# Patient Record
Sex: FEMALE | Race: WHITE | NOT HISPANIC OR LATINO | Employment: FULL TIME | URBAN - METROPOLITAN AREA
[De-identification: names, ages, dates, MRNs, and addresses within clinical notes are randomized per-mention and may not be internally consistent; named-entity substitution may affect disease eponyms.]

---

## 2017-02-16 ENCOUNTER — APPOINTMENT (OUTPATIENT)
Dept: WOMENS IMAGING | Facility: HOSPITAL | Age: 27
End: 2017-02-16

## 2017-02-16 PROCEDURE — 76641 ULTRASOUND BREAST COMPLETE: CPT | Performed by: RADIOLOGY

## 2017-02-16 PROCEDURE — G0206 DX MAMMO INCL CAD UNI: HCPCS | Performed by: RADIOLOGY

## 2017-02-16 PROCEDURE — 77065 DX MAMMO INCL CAD UNI: CPT | Performed by: RADIOLOGY

## 2017-02-17 ENCOUNTER — APPOINTMENT (OUTPATIENT)
Dept: WOMENS IMAGING | Facility: HOSPITAL | Age: 27
End: 2017-02-17

## 2017-02-17 PROCEDURE — 19083 BX BREAST 1ST LESION US IMAG: CPT | Performed by: RADIOLOGY

## 2017-02-17 PROCEDURE — 19000 PUNCTURE ASPIR CYST BREAST: CPT | Performed by: RADIOLOGY

## 2017-02-17 PROCEDURE — 76942 ECHO GUIDE FOR BIOPSY: CPT | Performed by: RADIOLOGY

## 2017-05-30 DIAGNOSIS — Z36.89 ENCOUNTER TO ESTABLISH GESTATIONAL AGE USING ULTRASOUND: Primary | ICD-10-CM

## 2017-06-14 ENCOUNTER — INITIAL PRENATAL (OUTPATIENT)
Dept: OBSTETRICS AND GYNECOLOGY | Facility: CLINIC | Age: 27
End: 2017-06-14

## 2017-06-14 ENCOUNTER — APPOINTMENT (OUTPATIENT)
Dept: LAB | Facility: HOSPITAL | Age: 27
End: 2017-06-14

## 2017-06-14 VITALS — SYSTOLIC BLOOD PRESSURE: 110 MMHG | WEIGHT: 108 LBS | DIASTOLIC BLOOD PRESSURE: 80 MMHG | BODY MASS INDEX: 19.75 KG/M2

## 2017-06-14 DIAGNOSIS — Z34.01 ENCOUNTER FOR SUPERVISION OF NORMAL FIRST PREGNANCY IN FIRST TRIMESTER: ICD-10-CM

## 2017-06-14 DIAGNOSIS — Z36.9 PRENATAL SCREENING ENCOUNTER: Primary | ICD-10-CM

## 2017-06-14 LAB
ABO GROUP BLD: NORMAL
BACTERIA UR QL AUTO: ABNORMAL /HPF
BASOPHILS # BLD AUTO: 0.03 10*3/MM3 (ref 0–0.2)
BASOPHILS NFR BLD AUTO: 0.3 % (ref 0–1)
BILIRUB UR QL STRIP: NEGATIVE
BLD GP AB SCN SERPL QL: NEGATIVE
CLARITY UR: CLEAR
COLOR UR: YELLOW
DEPRECATED RDW RBC AUTO: 42.8 FL (ref 37–54)
EOSINOPHIL # BLD AUTO: 0.05 10*3/MM3 (ref 0.1–0.3)
EOSINOPHIL NFR BLD AUTO: 0.6 % (ref 0–3)
ERYTHROCYTE [DISTWIDTH] IN BLOOD BY AUTOMATED COUNT: 12.4 % (ref 11.3–14.5)
GLUCOSE UR STRIP-MCNC: NEGATIVE MG/DL
HBA1C MFR BLD: 5.3 % (ref 4.8–5.6)
HBV SURFACE AG SERPL QL IA: NORMAL
HCT VFR BLD AUTO: 42.9 % (ref 34.5–44)
HCV AB SER DONR QL: NORMAL
HGB BLD-MCNC: 14.1 G/DL (ref 11.5–15.5)
HGB UR QL STRIP.AUTO: NEGATIVE
HIV1+2 AB SER QL: NORMAL
HYALINE CASTS UR QL AUTO: ABNORMAL /LPF
IMM GRANULOCYTES # BLD: 0.01 10*3/MM3 (ref 0–0.03)
IMM GRANULOCYTES NFR BLD: 0.1 % (ref 0–0.6)
KETONES UR QL STRIP: NEGATIVE
LEUKOCYTE ESTERASE UR QL STRIP.AUTO: ABNORMAL
LYMPHOCYTES # BLD AUTO: 1.96 10*3/MM3 (ref 0.6–4.8)
LYMPHOCYTES NFR BLD AUTO: 21.6 % (ref 24–44)
MCH RBC QN AUTO: 31.1 PG (ref 27–31)
MCHC RBC AUTO-ENTMCNC: 32.9 G/DL (ref 32–36)
MCV RBC AUTO: 94.5 FL (ref 80–99)
MONOCYTES # BLD AUTO: 0.71 10*3/MM3 (ref 0–1)
MONOCYTES NFR BLD AUTO: 7.8 % (ref 0–12)
NEUTROPHILS # BLD AUTO: 6.3 10*3/MM3 (ref 1.5–8.3)
NEUTROPHILS NFR BLD AUTO: 69.6 % (ref 41–71)
NITRITE UR QL STRIP: NEGATIVE
PH UR STRIP.AUTO: 7 [PH] (ref 5–8)
PLATELET # BLD AUTO: 199 10*3/MM3 (ref 150–450)
PMV BLD AUTO: 12.2 FL (ref 6–12)
PROT UR QL STRIP: NEGATIVE
RBC # BLD AUTO: 4.54 10*6/MM3 (ref 3.89–5.14)
RBC # UR: ABNORMAL /HPF
REF LAB TEST METHOD: ABNORMAL
RH BLD: POSITIVE
RUBV IGG SER QL: NORMAL
RUBV IGG SER-ACNC: 55.7 IU/ML
SP GR UR STRIP: 1.01 (ref 1–1.03)
SQUAMOUS #/AREA URNS HPF: ABNORMAL /HPF
TSH SERPL DL<=0.05 MIU/L-ACNC: 2.54 MIU/ML (ref 0.35–5.35)
UROBILINOGEN UR QL STRIP: ABNORMAL
WBC NRBC COR # BLD: 9.06 10*3/MM3 (ref 3.5–10.8)
WBC UR QL AUTO: ABNORMAL /HPF

## 2017-06-14 PROCEDURE — 84443 ASSAY THYROID STIM HORMONE: CPT | Performed by: OBSTETRICS & GYNECOLOGY

## 2017-06-14 PROCEDURE — 86900 BLOOD TYPING SEROLOGIC ABO: CPT | Performed by: OBSTETRICS & GYNECOLOGY

## 2017-06-14 PROCEDURE — 87086 URINE CULTURE/COLONY COUNT: CPT | Performed by: OBSTETRICS & GYNECOLOGY

## 2017-06-14 PROCEDURE — 85025 COMPLETE CBC W/AUTO DIFF WBC: CPT | Performed by: OBSTETRICS & GYNECOLOGY

## 2017-06-14 PROCEDURE — 0501F PRENATAL FLOW SHEET: CPT | Performed by: OBSTETRICS & GYNECOLOGY

## 2017-06-14 PROCEDURE — 86787 VARICELLA-ZOSTER ANTIBODY: CPT | Performed by: OBSTETRICS & GYNECOLOGY

## 2017-06-14 PROCEDURE — 83036 HEMOGLOBIN GLYCOSYLATED A1C: CPT | Performed by: OBSTETRICS & GYNECOLOGY

## 2017-06-14 PROCEDURE — 86803 HEPATITIS C AB TEST: CPT | Performed by: OBSTETRICS & GYNECOLOGY

## 2017-06-14 PROCEDURE — 80081 OBSTETRIC PANEL INC HIV TSTG: CPT | Performed by: OBSTETRICS & GYNECOLOGY

## 2017-06-14 PROCEDURE — 86850 RBC ANTIBODY SCREEN: CPT | Performed by: OBSTETRICS & GYNECOLOGY

## 2017-06-14 PROCEDURE — 36415 COLL VENOUS BLD VENIPUNCTURE: CPT | Performed by: OBSTETRICS & GYNECOLOGY

## 2017-06-14 PROCEDURE — 81001 URINALYSIS AUTO W/SCOPE: CPT | Performed by: OBSTETRICS & GYNECOLOGY

## 2017-06-14 PROCEDURE — 86901 BLOOD TYPING SEROLOGIC RH(D): CPT | Performed by: OBSTETRICS & GYNECOLOGY

## 2017-06-14 PROCEDURE — G0432 EIA HIV-1/HIV-2 SCREEN: HCPCS | Performed by: OBSTETRICS & GYNECOLOGY

## 2017-06-14 PROCEDURE — 87340 HEPATITIS B SURFACE AG IA: CPT | Performed by: OBSTETRICS & GYNECOLOGY

## 2017-06-14 PROCEDURE — 86762 RUBELLA ANTIBODY: CPT | Performed by: OBSTETRICS & GYNECOLOGY

## 2017-06-14 NOTE — PROGRESS NOTES
@SUBJECTIVEBEGIN  Chief Complaint   Patient presents with   • Initial Prenatal Visit     C/O mild cramping on right side.  LP 2016  neg   • Nausea       Jeri Chambers is a 27 y.o. year old .  Patient's last menstrual period was 2017..  She presents to be seen to initiate prenatal care.    She is currently having no problems   As it relates to the pregnancy, she has no concerns     Past Medical History:   Diagnosis Date   • Ovarian cyst    ,   Past Surgical History:   Procedure Laterality Date   • APPENDECTOMY N/A 10/5/2016    Procedure: APPENDECTOMY LAPAROSCOPIC;  Surgeon: Jean-Pierre Linder MD;  Location: [facility];  Service:    • APPENDECTOMY  10/05/2016   • BREAST BIOPSY     ,   Family History   Problem Relation Age of Onset   • No Known Problems Mother    • No Known Problems Father    • Colon cancer Paternal Grandfather    • Breast cancer Paternal Aunt    • Ovarian cancer Neg Hx    • Diabetes Neg Hx    ,   Social History   Substance Use Topics   • Smoking status: Never Smoker   • Smokeless tobacco: None   • Alcohol use No      Comment: OCCASIONAL   ,   (Not in a hospital admission) and Allergies:  Penicillins    Smoking status: Never Smoker                                                                 Smokeless status: Not on file                         The following portions of the patient's history were reviewed and updated as appropriate:vital signs, allergies, current medications, past medical history, past social history, past surgical history and problem list.    Objective     Imaging   No data reviewed    Review of Systems - History obtained from the patient  General ROS: negative  Psychological ROS: negative  ENT ROS: negative  Allergy and Immunology ROS: negative  Hematological and Lymphatic ROS: negative  Endocrine ROS: negative  Breast ROS: negative for breast lumps  Respiratory ROS: no cough, shortness of breath, or wheezing  Cardiovascular ROS: no chest pain or dyspnea on  exertion  Gastrointestinal ROS: no abdominal pain, change in bowel habits, or black or bloody stools  Genito-Urinary ROS: no dysuria, trouble voiding, or hematuria  Musculoskeletal ROS: negative  Neurological ROS: no TIA or stroke symptoms  Dermatological ROS: negative     Physical Exam:  See Episode    Assessment/Plan   ASSESSMENT  Jeri was seen today for initial prenatal visit and nausea.    Diagnoses and all orders for this visit:    Prenatal screening encounter  -     Obstetric Panel  -     HIV-1 / O / 2 Ag / Antibody 4th Generation  -     Varicella Zoster Antibody, IgG  -     Chlamydia trachomatis, Neisseria gonorrhoeae, Trichomonas vaginalis, PCR  -     Hemoglobin A1c  -     Hepatitis C Antibody  -     TSH  -     Urinalysis With / Culture If Indicated    Encounter for supervision of normal first pregnancy in first trimester    Other orders  -     Cancel: Urinalysis With Microscopic; Future  -     Cancel: Urine Culture; Future  -     Cancel: IGP, CtNg, Rfx Aptima HPV ASCU; Future        PLAN  1. Tests ordered today:  Orders Placed This Encounter   Procedures   • Chlamydia trachomatis, Neisseria gonorrhoeae, Trichomonas vaginalis, PCR   • Obstetric Panel   • HIV-1 / O / 2 Ag / Antibody 4th Generation   • Varicella Zoster Antibody, IgG   • Hemoglobin A1c   • Hepatitis C Antibody   • TSH   • Urinalysis With / Culture If Indicated     2. Medications prescribed today:  No orders of the defined types were placed in this encounter.    3. Information reviewed: exercise in pregnancy, nutrition in pregnancy, weight gain in pregnancy, work and travel restrictions during pregnancy, list of OTC medications acceptable in pregnancy and call coverage groups  4. Genetic testing reviewed: declines  5. The problem list for pregnancy was initiated today  6. Pt does not want treatment for nausea     Follow up: 2 week(s)       This note was electronically signed.    Kobe De Luna MD   June 14, 2017

## 2017-06-16 LAB
BACTERIA SPEC AEROBE CULT: NORMAL
RPR SER QL: NORMAL
VZV IGG SER IA-ACNC: 2167 INDEX

## 2017-06-29 ENCOUNTER — HOSPITAL ENCOUNTER (OUTPATIENT)
Dept: WOMENS IMAGING | Facility: HOSPITAL | Age: 27
Discharge: HOME | End: 2017-06-29
Attending: OBSTETRICS & GYNECOLOGY | Admitting: OBSTETRICS & GYNECOLOGY

## 2017-06-29 ENCOUNTER — ROUTINE PRENATAL (OUTPATIENT)
Dept: OBSTETRICS AND GYNECOLOGY | Facility: CLINIC | Age: 27
End: 2017-06-29

## 2017-06-29 VITALS — BODY MASS INDEX: 20.12 KG/M2 | SYSTOLIC BLOOD PRESSURE: 100 MMHG | WEIGHT: 110 LBS | DIASTOLIC BLOOD PRESSURE: 72 MMHG

## 2017-06-29 DIAGNOSIS — Z36.2 ENCOUNTER FOR REPEAT ULTRASOUND FOR FETAL HEART RATE: ICD-10-CM

## 2017-06-29 DIAGNOSIS — Z36.9 PRENATAL SCREENING ENCOUNTER: ICD-10-CM

## 2017-06-29 DIAGNOSIS — Z20.821 ZIKA VIRUS EXPOSURE: ICD-10-CM

## 2017-06-29 DIAGNOSIS — Z34.01 ENCOUNTER FOR SUPERVISION OF NORMAL FIRST PREGNANCY IN FIRST TRIMESTER: Primary | ICD-10-CM

## 2017-06-29 PROCEDURE — 76817 TRANSVAGINAL US OBSTETRIC: CPT | Performed by: OBSTETRICS & GYNECOLOGY

## 2017-06-29 PROCEDURE — 0502F SUBSEQUENT PRENATAL CARE: CPT | Performed by: OBSTETRICS & GYNECOLOGY

## 2017-06-29 PROCEDURE — 76817 TRANSVAGINAL US OBSTETRIC: CPT

## 2017-06-29 NOTE — PROGRESS NOTES
No results found for: ABORH, LABANTI, ABID    Chief Complaint   Patient presents with   • Routine Prenatal Visit     Complains of some nausea , not bad.       Today Jeri complains of nausea without vomiting. Patient has been to [place] and wants Zika testing  See ob flowsheet for physical exam.    Prenatal Assessment  Fetal Heart Rate: -  Prenatal Vitals  BP: 100/72  Weight: 110 lb (49.9 kg)     Tests done today: none  At the time of the next visit, she will need to have none    Impression: No diagnosis found.    Plan: Return 4 weeks, pt sent to [facility] for US and Zika screening    This note was electronically signed.    Kobe De Luna MD

## 2017-06-30 ENCOUNTER — APPOINTMENT (OUTPATIENT)
Dept: LAB | Facility: HOSPITAL | Age: 27
End: 2017-06-30

## 2017-06-30 PROCEDURE — 87798 DETECT AGENT NOS DNA AMP: CPT | Performed by: OBSTETRICS & GYNECOLOGY

## 2017-07-13 LAB
ZIKA VIRUS, NAA, SERUM: NEGATIVE
ZIKA VIRUS, NAA, URINE: NEGATIVE

## 2017-07-27 ENCOUNTER — APPOINTMENT (OUTPATIENT)
Dept: WOMENS IMAGING | Facility: HOSPITAL | Age: 27
End: 2017-07-27
Attending: OBSTETRICS & GYNECOLOGY

## 2017-07-27 ENCOUNTER — ROUTINE PRENATAL (OUTPATIENT)
Dept: OBSTETRICS AND GYNECOLOGY | Facility: CLINIC | Age: 27
End: 2017-07-27

## 2017-07-27 VITALS — WEIGHT: 115 LBS | SYSTOLIC BLOOD PRESSURE: 104 MMHG | DIASTOLIC BLOOD PRESSURE: 60 MMHG | BODY MASS INDEX: 21.03 KG/M2

## 2017-07-27 DIAGNOSIS — Z34.01 ENCOUNTER FOR SUPERVISION OF NORMAL FIRST PREGNANCY IN FIRST TRIMESTER: Primary | ICD-10-CM

## 2017-07-27 PROCEDURE — 99213 OFFICE O/P EST LOW 20 MIN: CPT | Performed by: OBSTETRICS & GYNECOLOGY

## 2017-07-27 NOTE — PROGRESS NOTES
No results found for: ABORH, LABANTI, ABID    Chief Complaint   Patient presents with   • Routine Prenatal Visit     Pt. has no complaints today.        Today Jeri has no specific complaints  See ob flowsheet for physical exam.    Prenatal Assessment  Fetal Heart Rate: 146  Movement: Absent  Prenatal Vitals  BP: 104/60  Weight: 115 lb (52.2 kg)  Urine Glucose/Protein  Urine Glucose: Negative  Urine Protein: Negative     Tests done today: none  At the time of the next visit, she will need to have none    Impression:   Encounter Diagnoses   Name Primary?   • Encounter for supervision of normal first pregnancy in first trimester Yes       Plan: return 4 weeks    This note was electronically signed.    Kobe De Luna MD

## 2017-08-14 ENCOUNTER — ROUTINE PRENATAL (OUTPATIENT)
Dept: OBSTETRICS AND GYNECOLOGY | Facility: CLINIC | Age: 27
End: 2017-08-14

## 2017-08-14 VITALS — SYSTOLIC BLOOD PRESSURE: 120 MMHG | WEIGHT: 117 LBS | BODY MASS INDEX: 21.4 KG/M2 | DIASTOLIC BLOOD PRESSURE: 78 MMHG

## 2017-08-14 DIAGNOSIS — Z34.02 ENCOUNTER FOR SUPERVISION OF NORMAL FIRST PREGNANCY IN SECOND TRIMESTER: Primary | ICD-10-CM
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
## 2017-08-14 PROCEDURE — 0502F SUBSEQUENT PRENATAL CARE: CPT | Performed by: OBSTETRICS & GYNECOLOGY

## 2017-08-14 NOTE — PROGRESS NOTES
No results found for: ABORH, LABANTI, ABID    Chief Complaint   Patient presents with   • Routine Prenatal Visit     Pt. states she had went to her parents house in [place], they [place].  She went for a walk around the pond and got attacked by bugs that look like ticks.  Pt has welts all over body, even the labia.  She complains of skin irritation, redness and major itching.  It happened Saturday.        Today Jeri tick bites  See ob flowsheet for physical exam.    Prenatal Assessment  Fetal Heart Rate: 150  Movement: Present  Prenatal Vitals  BP: 120/78  Weight: 117 lb (53.1 kg)  Urine Glucose/Protein  Urine Glucose: Negative  Urine Protein: Negative     Tests done today: consult ID  At the time of the next visit, she will need to have none    Impression:   Encounter Diagnoses   Name Primary?   • Encounter for supervision of normal first pregnancy in second trimester Yes   • Tick bite, initial encounter        Plan: Return 8/25/17 for routine visit, ID recommended following patient and treat only if she became symptomatic    This note was electronically signed.    Kobe De Luna MD

## 2017-08-25 ENCOUNTER — ROUTINE PRENATAL (OUTPATIENT)
Dept: OBSTETRICS AND GYNECOLOGY | Facility: CLINIC | Age: 27
End: 2017-08-25

## 2017-08-25 VITALS — BODY MASS INDEX: 21.95 KG/M2 | DIASTOLIC BLOOD PRESSURE: 70 MMHG | SYSTOLIC BLOOD PRESSURE: 124 MMHG | WEIGHT: 120 LBS

## 2017-08-25 DIAGNOSIS — Z36.89 ENCOUNTER FOR FETAL ANATOMIC SURVEY: ICD-10-CM

## 2017-08-25 DIAGNOSIS — Z34.02 ENCOUNTER FOR SUPERVISION OF NORMAL FIRST PREGNANCY IN SECOND TRIMESTER: Primary | ICD-10-CM
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
## 2017-08-25 PROCEDURE — 0502F SUBSEQUENT PRENATAL CARE: CPT | Performed by: OBSTETRICS & GYNECOLOGY

## 2017-08-25 NOTE — PROGRESS NOTES
No results found for: ABORH, LABANTI, ABID    Chief Complaint   Patient presents with   • Routine Prenatal Visit     Pt. has no complaints today.  Pt states she is feeling a lot better with the itching from the [place] ticks.       Today Jeri has no specific complaints  See ob flowsheet for physical exam.    Prenatal Assessment  Fetal Heart Rate: 148  Movement: Present  Prenatal Vitals  BP: 124/70  Weight: 120 lb (54.4 kg)  Urine Glucose/Protein  Urine Glucose: Negative  Urine Protein: Negative     Tests done today: none  At the time of the next visit, she will need to have U/S to complete the anatomic screening - at [facility]    Impression:   Encounter Diagnoses   Name Primary?   • Encounter for supervision of normal first pregnancy in second trimester Yes   • Tick bite, initial encounter    • Encounter for fetal anatomic survey        Plan: Return 3 weeks    This note was electronically signed.    Kobe eD Luna MD

## 2017-09-15 ENCOUNTER — ROUTINE PRENATAL (OUTPATIENT)
Dept: OBSTETRICS AND GYNECOLOGY | Facility: CLINIC | Age: 27
End: 2017-09-15

## 2017-09-15 ENCOUNTER — OFFICE VISIT (OUTPATIENT)
Dept: OBSTETRICS AND GYNECOLOGY | Facility: HOSPITAL | Age: 27
End: 2017-09-15

## 2017-09-15 ENCOUNTER — HOSPITAL ENCOUNTER (OUTPATIENT)
Dept: WOMENS IMAGING | Facility: HOSPITAL | Age: 27
Discharge: HOME | End: 2017-09-15
Attending: OBSTETRICS & GYNECOLOGY | Admitting: OBSTETRICS & GYNECOLOGY

## 2017-09-15 VITALS
WEIGHT: 123.2 LBS | SYSTOLIC BLOOD PRESSURE: 120 MMHG | HEIGHT: 64 IN | BODY MASS INDEX: 21.03 KG/M2 | DIASTOLIC BLOOD PRESSURE: 74 MMHG

## 2017-09-15 VITALS — BODY MASS INDEX: 21.45 KG/M2 | DIASTOLIC BLOOD PRESSURE: 70 MMHG | WEIGHT: 123 LBS | SYSTOLIC BLOOD PRESSURE: 120 MMHG

## 2017-09-15 DIAGNOSIS — Z34.90 PREGNANCY, UNSPECIFIED GESTATIONAL AGE: Primary | ICD-10-CM

## 2017-09-15 DIAGNOSIS — Z34.02 ENCOUNTER FOR SUPERVISION OF NORMAL FIRST PREGNANCY IN SECOND TRIMESTER: Primary | ICD-10-CM

## 2017-09-15 DIAGNOSIS — Z36.89 ENCOUNTER FOR FETAL ANATOMIC SURVEY: ICD-10-CM

## 2017-09-15 PROCEDURE — 76805 OB US >/= 14 WKS SNGL FETUS: CPT | Performed by: OBSTETRICS & GYNECOLOGY

## 2017-09-15 PROCEDURE — 76805 OB US >/= 14 WKS SNGL FETUS: CPT

## 2017-09-15 PROCEDURE — 0502F SUBSEQUENT PRENATAL CARE: CPT | Performed by: OBSTETRICS & GYNECOLOGY

## 2017-09-15 NOTE — PROGRESS NOTES
Denies problems. Declined genetic screening. To see Dr. De Luna today. Reports had multiple tick bites last month; saw Infectious Disease MD.

## 2017-09-15 NOTE — PROGRESS NOTES
No results found for: ABORH, LABANTI, ABID    Chief Complaint   Patient presents with   • Routine Prenatal Visit     Pt. has no complaints, just came from the [facility].       Today Jeri has no specific complaints  See ob flowsheet for physical exam.    Prenatal Assessment  Fetal Heart Rate: 144  Movement: Present  Prenatal Vitals  BP: 120/70  Weight: 123 lb (55.8 kg)     Tests done today: U/S for anatomic screening - U/S report is not available for review at this time  At the time of the next visit, she will need to have none    Impression:   Encounter Diagnoses   Name Primary?   • Encounter for supervision of normal first pregnancy in second trimester Yes       Plan: Return 4 weeks    This note was electronically signed.    Kobe De Luna MD

## 2017-10-13 ENCOUNTER — HOSPITAL ENCOUNTER (OUTPATIENT)
Facility: HOSPITAL | Age: 27
Setting detail: OBSERVATION
Discharge: HOME | End: 2017-10-13
Attending: OBSTETRICS & GYNECOLOGY | Admitting: OBSTETRICS & GYNECOLOGY

## 2017-10-13 VITALS
TEMPERATURE: 98.2 F | RESPIRATION RATE: 20 BRPM | SYSTOLIC BLOOD PRESSURE: 110 MMHG | DIASTOLIC BLOOD PRESSURE: 75 MMHG | BODY MASS INDEX: 21.62 KG/M2 | HEART RATE: 86 BPM | HEIGHT: 63 IN | WEIGHT: 122 LBS

## 2017-10-13 LAB
BACTERIA UR QL AUTO: ABNORMAL /HPF
BILIRUB UR QL STRIP: NEGATIVE
CLARITY UR: CLEAR
COLOR UR: YELLOW
GLUCOSE UR STRIP-MCNC: NEGATIVE MG/DL
HGB UR QL STRIP.AUTO: NEGATIVE
HYALINE CASTS UR QL AUTO: ABNORMAL /LPF
KETONES UR QL STRIP: NEGATIVE
LEUKOCYTE ESTERASE UR QL STRIP.AUTO: ABNORMAL
NITRITE UR QL STRIP: NEGATIVE
PH UR STRIP.AUTO: 7.5 [PH] (ref 5–8)
PROT UR QL STRIP: NEGATIVE
RBC # UR: ABNORMAL /HPF
REF LAB TEST METHOD: ABNORMAL
SP GR UR STRIP: 1.01 (ref 1–1.03)
SQUAMOUS #/AREA URNS HPF: ABNORMAL /HPF
UROBILINOGEN UR QL STRIP: ABNORMAL
WBC UR QL AUTO: ABNORMAL /HPF

## 2017-10-13 PROCEDURE — 99218 PR INITIAL OBSERVATION CARE/DAY 30 MINUTES: CPT | Performed by: OBSTETRICS & GYNECOLOGY

## 2017-10-13 PROCEDURE — G0378 HOSPITAL OBSERVATION PER HR: HCPCS

## 2017-10-13 PROCEDURE — 59025 FETAL NON-STRESS TEST: CPT

## 2017-10-13 PROCEDURE — 81001 URINALYSIS AUTO W/SCOPE: CPT | Performed by: OBSTETRICS & GYNECOLOGY

## 2017-10-13 NOTE — NURSING NOTE
Discharge instructions discussed with pt, pt verbalizes understanding. Pt denies any questions or concerns at this time.  Pt d/c to private vehicle, stable condition, VSS.

## 2017-10-13 NOTE — PROGRESS NOTES
[facility]  Obstetric History and Physical    Chief Complaint   Patient presents with   • Abdominal Cramping       Subjective     Patient is a 27 y.o. female  currently at 23w6d, who presents with ABD pain.Reports some mild sharp lower abdominal pain, onset after moving a patient this morning.  Patient denies any leaking of fluid or vaginal bleeding.  Patient reports normal fetal activity.  Patient denies any other associated symptoms..  Prenatal course with Dr. Ramirez without complications.    Her prenatal care is complicated by  .  Her previous obstetric/gynecological history is noted for is remarkable for .    The following portions of the patients history were reviewed and updated as appropriate: current medications, allergies, past medical history, past surgical history, past family history, past social history and problem list .       Prenatal Information:   Maternal Prenatal Labs  Blood Type No results found for: ABO   Rh Status No results found for: RH   Antibody Screen No results found for: ABSCRN   Gonnorhea No results found for: GCCX   Chlamydia No results found for: CLAMYDCU   RPR No results found for: RPR   Syphilis Antibody No results found for: SYPHILIS   Rubella No results found for: RUBELLAIGGIN   Hepatitis B Surface Antigen No results found for: HEPBSAG   HIV-1 Antibody No results found for: LABHIV1   Hepatitis C Antibody No results found for: HEPCAB   Rapid Urin Drug Screen No results found for: AMPMETHU, BARBITSCNUR, LABBENZSCN, LABMETHSCN, LABOPIASCN, THCURSCR, COCAINEUR, AMPHETSCREEN, PROPOXSCN, BUPRENORSCNU, METAMPSCNUR, OXYCODONESCN, TRICYCLICSCN   Group B Strep Culture No results found for: GBSANTIGEN                 Past OB History:       Obstetric History       T0      L0     SAB0   TAB0   Ectopic0   Multiple0   Live Births0       # Outcome Date GA Lbr Nagi/2nd Weight Sex Delivery Anes PTL Lv   1 Current                   Past Medical History: Past Medical History:    Diagnosis Date   • Ovarian cyst     hx recurrent cysts   • Tick bite 08/2017    Multiple tick bites; saw Infectious Disease MD      Past Surgical History Past Surgical History:   Procedure Laterality Date   • ADENOIDECTOMY  2016   • APPENDECTOMY N/A 10/5/2016    Procedure: APPENDECTOMY LAPAROSCOPIC;  Surgeon: Jean-Pierre Linder MD;  Location: [facility];  Service:    • APPENDECTOMY  10/05/2016   • BREAST BIOPSY Right 01/2017    WNL      Family History: Family History   Problem Relation Age of Onset   • No Known Problems Mother    • No Known Problems Father    • Colon cancer Paternal Grandfather    • Breast cancer Paternal Aunt    • Ovarian cancer Neg Hx    • Diabetes Neg Hx       Social History:  reports that she has never smoked. She has never used smokeless tobacco.   reports that she does not drink alcohol.   reports that she does not use illicit drugs.                   General ROS Negative Findings:Headaches, Visual Changes, Epigastric pain, Anorexia, Nausia/Vomiting, ROM and Vaginal Bleeding    ROS      Objective       Vital Signs Range for the last 24 hours  Temperature: Temp:  [98.2 °F (36.8 °C)] 98.2 °F (36.8 °C)   Temp Source: Temp src: Oral   BP: BP: (110)/(75) 110/75   Pulse: Heart Rate:  [86] 86   Respirations: Resp:  [20] 20   SPO2:     O2 Amount (l/min):     O2 Devices     Weight: Weight:  [55.3 kg (122 lb)] 55.3 kg (122 lb)     Physical Examination:   General:   alert, appears stated age and cooperative   Skin:   normal   HEENT:     Lungs:   clear to auscultation bilaterally   Heart:   regular rate and rhythm, S1, S2 normal, no murmur, click, rub or gallop   Abdomen:  soft, gravid uterus non tender no guarding, no rebound   Lower Extremeties No edema, no calf tenderness   Pelvis:  External genitalia: normal general appearance  Uterus: enlarged         Presentation: vtx   Cervix: Exam by: Method: sterile exam per physician   Dilation: Dilation: 0   Effacement:     Station:         Fetal Heart Rate  Assessment   Method: Fetal HR Assessment Method: external   Beats/min:     Baseline: Fetal HR Baseline: normal range (110-160 bpm)   Varibility: Fetal HR Variability: moderate (amplitude range 6 to 25 bpm)   Accels: Fetal HR Accelerations: greater than/equal to10 bpm (32 wks gest or less)   Decels: Fetal HR Decelerations: absent   Tracing Category:       Uterine Assessment   Method: Method: TOCO (external toco transducer)   Frequency (min):     Ctx Count in 10 min:     Duration:     Intensity: Contraction Intensity: mild by palpation   Intensity by IUPC:     Resting Tone: Uterine Resting Tone: soft by palpation   Resting Tone by IUPC:     [place] Units:       Laboratory Results: @xyxfkehe62@    neg  Radiology Review:@lastrad@  Other Studies:    Assessment/Plan     Active Problems:    Pregnancy        Assessment:  1.  Intrauterine pregnancy at 23w6d weeks gestation with reactive fetal status.    2.  Discomforts of pregnancy  3.  No evidence of labor or rupture membranes.  4.      Plan:  1. discharge to home, PTL instructions.F/U OB routine or prn  2. Plan of care has been reviewed with patient.  3.  Risks, benefits of treatment plan have been discussed.  4.  All questions have been answered.  5      Justin Meraz DO  10/13/2017  12:42 PM

## 2017-10-16 ENCOUNTER — ROUTINE PRENATAL (OUTPATIENT)
Dept: OBSTETRICS AND GYNECOLOGY | Facility: CLINIC | Age: 27
End: 2017-10-16

## 2017-10-16 VITALS — WEIGHT: 132 LBS | BODY MASS INDEX: 23.38 KG/M2 | SYSTOLIC BLOOD PRESSURE: 120 MMHG | DIASTOLIC BLOOD PRESSURE: 80 MMHG

## 2017-10-16 DIAGNOSIS — Z34.02 ENCOUNTER FOR SUPERVISION OF NORMAL FIRST PREGNANCY IN SECOND TRIMESTER: Primary | ICD-10-CM

## 2017-10-16 DIAGNOSIS — O47.02 PRETERM UTERINE CONTRACTIONS IN SECOND TRIMESTER, ANTEPARTUM: ICD-10-CM

## 2017-10-16 PROCEDURE — 0502F SUBSEQUENT PRENATAL CARE: CPT | Performed by: OBSTETRICS & GYNECOLOGY

## 2017-10-16 NOTE — PROGRESS NOTES
No results found for: ABORH, LABANTI, ABID    Chief Complaint   Patient presents with   • Routine Prenatal Visit     Was in hospital on Friday for contractions       Today Jeri complains of irregular contractions   See ob flowsheet for physical exam.    Prenatal Assessment  Fetal Heart Rate: 160  Movement: Present  Prenatal Vitals  BP: 120/80  Weight: 132 lb (59.9 kg)  Urine Glucose/Protein  Urine Glucose: Negative  Urine Protein: Negative  Edema  LLE Edema: None  RLE Edema: None  Facial Edema: None     Tests done today: none  At the time of the next visit, she will need to have none    Impression:   Encounter Diagnoses   Name Primary?   • Encounter for supervision of normal first pregnancy in second trimester Yes   •  uterine contractions in second trimester, antepartum        Plan: Return 2 weeks, patient was seen in labor [place] for  labor on 10/13/2017 but only had one contraction on the external monitor.    This note was electronically signed.    Kobe De Luna MD

## 2017-10-23 ENCOUNTER — TELEPHONE (OUTPATIENT)
Dept: OBSTETRICS AND GYNECOLOGY | Facility: CLINIC | Age: 27
End: 2017-10-23

## 2017-10-23 NOTE — TELEPHONE ENCOUNTER
Patient called c/o contractions after working her 14 hr shift. Recommended rest today and tomorrow while not working , and increase fluids. Report back if contractions continue or get worse.

## 2017-10-30 ENCOUNTER — ROUTINE PRENATAL (OUTPATIENT)
Dept: OBSTETRICS AND GYNECOLOGY | Facility: CLINIC | Age: 27
End: 2017-10-30

## 2017-10-30 VITALS — SYSTOLIC BLOOD PRESSURE: 110 MMHG | BODY MASS INDEX: 23.74 KG/M2 | WEIGHT: 134 LBS | DIASTOLIC BLOOD PRESSURE: 70 MMHG

## 2017-10-30 DIAGNOSIS — Z36.9 PRENATAL SCREENING ENCOUNTER: ICD-10-CM

## 2017-10-30 DIAGNOSIS — O47.02 PRETERM UTERINE CONTRACTIONS IN SECOND TRIMESTER, ANTEPARTUM: ICD-10-CM

## 2017-10-30 DIAGNOSIS — Z34.02 ENCOUNTER FOR SUPERVISION OF NORMAL FIRST PREGNANCY IN SECOND TRIMESTER: Primary | ICD-10-CM

## 2017-10-30 PROCEDURE — 0502F SUBSEQUENT PRENATAL CARE: CPT | Performed by: OBSTETRICS & GYNECOLOGY

## 2017-10-30 RX ORDER — NIFEDIPINE 10 MG/1
10 CAPSULE ORAL
Qty: 50 CAPSULE | Refills: 6 | Status: SHIPPED | OUTPATIENT
Start: 2017-10-30 | End: 2018-01-18

## 2017-10-30 NOTE — PROGRESS NOTES
No results found for: ABORH, LABANTI, ABID    Chief Complaint   Patient presents with   • Routine Prenatal Visit     States she is having fewer contractions. No complaints       Today Jeri complains of irregular contractions   See ob flowsheet for physical exam.    Prenatal Assessment  Fetal Heart Rate: 164  Fundal Height (cm): 27 cm  Movement: Present  Prenatal Vitals  BP: 110/70  Weight: 134 lb (60.8 kg)  Urine Glucose/Protein  Urine Glucose: Negative  Urine Protein: Negative  Vaginal Drainage  Draining Fluid: No  Edema  LLE Edema: None  RLE Edema: None  Facial Edema: None     Tests done today: GCT  At the time of the next visit, she will need to have none    Impression:   Encounter Diagnoses   Name Primary?   • Encounter for supervision of normal first pregnancy in second trimester Yes   •  uterine contractions in second trimester, antepartum    • Prenatal screening encounter        Plan: Return 2 weeks, Glucola in 2 weeks    This note was electronically signed.    Kobe De Luna MD

## 2017-11-01 ENCOUNTER — HOSPITAL ENCOUNTER (OUTPATIENT)
Facility: HOSPITAL | Age: 27
Setting detail: OBSERVATION
Discharge: HOME | End: 2017-11-01
Attending: OBSTETRICS & GYNECOLOGY | Admitting: OBSTETRICS & GYNECOLOGY

## 2017-11-01 ENCOUNTER — TELEPHONE (OUTPATIENT)
Dept: OBSTETRICS AND GYNECOLOGY | Facility: CLINIC | Age: 27
End: 2017-11-01

## 2017-11-01 VITALS — BODY MASS INDEX: 24.17 KG/M2 | TEMPERATURE: 98.1 F | RESPIRATION RATE: 18 BRPM | WEIGHT: 136.4 LBS | HEIGHT: 63 IN

## 2017-11-01 LAB
EXPIRATION DATE: NORMAL
Lab: NORMAL
PROT UR STRIP-MCNC: NEGATIVE MG/DL

## 2017-11-01 PROCEDURE — 59025 FETAL NON-STRESS TEST: CPT

## 2017-11-01 PROCEDURE — 25010000002 TERBUTALINE PER 1 MG: Performed by: OBSTETRICS & GYNECOLOGY

## 2017-11-01 PROCEDURE — 96372 THER/PROPH/DIAG INJ SC/IM: CPT

## 2017-11-01 PROCEDURE — G0378 HOSPITAL OBSERVATION PER HR: HCPCS

## 2017-11-01 PROCEDURE — 81002 URINALYSIS NONAUTO W/O SCOPE: CPT | Performed by: OBSTETRICS & GYNECOLOGY

## 2017-11-01 RX ORDER — TERBUTALINE SULFATE 1 MG/ML
0.25 INJECTION, SOLUTION SUBCUTANEOUS ONCE
Status: COMPLETED | OUTPATIENT
Start: 2017-11-01 | End: 2017-11-01

## 2017-11-01 RX ADMIN — TERBUTALINE SULFATE 0.25 MG: 1 INJECTION SUBCUTANEOUS at 07:15

## 2017-11-01 NOTE — NON STRESS TEST
Jeri Chambers, a  at 26w4d with an FABRIZIO of 2/3/2018, by Last Menstrual Period, was seen at [facility] for a nonstress test.    Chief Complaint   Patient presents with   • Laboring     since 0300. Has been working tonight.         Interpretation A  Nonstress Test Interpretation A: Reactive (17 0820 : Neela Peralta RN)      ]

## 2017-11-01 NOTE — NURSING NOTE
Discharge instructions provided to pt. Pt verbalized understanding.Instructed pt to follow up with regular md. Pt up to dress.

## 2017-11-01 NOTE — TELEPHONE ENCOUNTER
Patient was admitted to hospital in [place] for  labor last night during her work shift. States she was given Breathine, contractions stopped. She has Rx from Dr. De Luna that she will start today. She is requesting note to allow her to go to light duty.

## 2017-11-13 ENCOUNTER — ROUTINE PRENATAL (OUTPATIENT)
Dept: OBSTETRICS AND GYNECOLOGY | Facility: CLINIC | Age: 27
End: 2017-11-13

## 2017-11-13 ENCOUNTER — RESULTS ENCOUNTER (OUTPATIENT)
Dept: OBSTETRICS AND GYNECOLOGY | Facility: CLINIC | Age: 27
End: 2017-11-13

## 2017-11-13 ENCOUNTER — TELEPHONE (OUTPATIENT)
Dept: OBSTETRICS AND GYNECOLOGY | Facility: CLINIC | Age: 27
End: 2017-11-13

## 2017-11-13 VITALS — DIASTOLIC BLOOD PRESSURE: 70 MMHG | SYSTOLIC BLOOD PRESSURE: 112 MMHG | BODY MASS INDEX: 24.09 KG/M2 | WEIGHT: 136 LBS

## 2017-11-13 DIAGNOSIS — Z36.9 PRENATAL SCREENING ENCOUNTER: ICD-10-CM

## 2017-11-13 DIAGNOSIS — Z36.9 ANTENATAL SCREENING ENCOUNTER: Primary | ICD-10-CM

## 2017-11-13 DIAGNOSIS — Z34.03 ENCOUNTER FOR SUPERVISION OF NORMAL FIRST PREGNANCY IN THIRD TRIMESTER: Primary | ICD-10-CM

## 2017-11-13 DIAGNOSIS — O47.02 PRETERM UTERINE CONTRACTIONS IN SECOND TRIMESTER, ANTEPARTUM: ICD-10-CM

## 2017-11-13 LAB — GLUCOSE 1H P 100 G GLC PO SERPL-MCNC: 174 MG/DL (ref 65–199)

## 2017-11-13 PROCEDURE — 82962 GLUCOSE BLOOD TEST: CPT | Performed by: OBSTETRICS & GYNECOLOGY

## 2017-11-13 PROCEDURE — 82950 GLUCOSE TEST: CPT | Performed by: OBSTETRICS & GYNECOLOGY

## 2017-11-13 PROCEDURE — 36415 COLL VENOUS BLD VENIPUNCTURE: CPT | Performed by: OBSTETRICS & GYNECOLOGY

## 2017-11-13 PROCEDURE — 0502F SUBSEQUENT PRENATAL CARE: CPT | Performed by: OBSTETRICS & GYNECOLOGY

## 2017-11-13 NOTE — TELEPHONE ENCOUNTER
Patient was called and informed that her 1 hour Glucola was 174.  A 3 hour GTT was ordered.    Kobe De Luna MD

## 2017-11-13 NOTE — PROGRESS NOTES
No results found for: ABORH, LABANTI, ABID    Chief Complaint   Patient presents with   • Routine Prenatal Visit     Pt. has no complaints.  Pt did her 1 hour GTT today.       Today Jeri has no specific complaints  See ob flowsheet for physical exam.    Prenatal Assessment  Fetal Heart Rate: 160  Movement: Present  Prenatal Vitals  BP: 112/70  Weight: 136 lb (61.7 kg)  Urine Glucose/Protein  Urine Glucose: Negative  Urine Protein: Negative     Tests done today: GCT  At the time of the next visit, she will need to have none    Impression:   Encounter Diagnoses   Name Primary?   • Encounter for supervision of normal first pregnancy in third trimester Yes   •  uterine contractions in second trimester, antepartum        Plan: Return 2 weeks    This note was electronically signed.    Kobe De Luna MD

## 2017-11-14 PROCEDURE — 82952 GTT-ADDED SAMPLES: CPT | Performed by: OBSTETRICS & GYNECOLOGY

## 2017-11-14 PROCEDURE — 82962 GLUCOSE BLOOD TEST: CPT | Performed by: OBSTETRICS & GYNECOLOGY

## 2017-11-14 PROCEDURE — 82951 GLUCOSE TOLERANCE TEST (GTT): CPT | Performed by: OBSTETRICS & GYNECOLOGY

## 2017-11-14 PROCEDURE — 36415 COLL VENOUS BLD VENIPUNCTURE: CPT

## 2017-11-27 ENCOUNTER — ROUTINE PRENATAL (OUTPATIENT)
Dept: OBSTETRICS AND GYNECOLOGY | Facility: CLINIC | Age: 27
End: 2017-11-27

## 2017-11-27 VITALS — DIASTOLIC BLOOD PRESSURE: 70 MMHG | SYSTOLIC BLOOD PRESSURE: 104 MMHG | BODY MASS INDEX: 24.8 KG/M2 | WEIGHT: 140 LBS

## 2017-11-27 DIAGNOSIS — Z34.03 ENCOUNTER FOR SUPERVISION OF NORMAL FIRST PREGNANCY IN THIRD TRIMESTER: Primary | ICD-10-CM

## 2017-11-27 PROCEDURE — 0502F SUBSEQUENT PRENATAL CARE: CPT | Performed by: OBSTETRICS & GYNECOLOGY

## 2017-11-27 NOTE — PROGRESS NOTES
No results found for: ABORH, LABANTI, ABID    Chief Complaint   Patient presents with   • Routine Prenatal Visit     Pt. has no complaints today, doing well.        Today Jeri has no specific complaints  See ob flowsheet for physical exam.    Prenatal Assessment  Fetal Heart Rate: 148  Movement: Present  Prenatal Vitals  BP: 104/70  Weight: 140 lb (63.5 kg)  Urine Glucose/Protein  Urine Glucose: Negative  Urine Protein: Negative     Tests done today: none  At the time of the next visit, she will need to have none    Impression:   Encounter Diagnoses   Name Primary?   • Encounter for supervision of normal first pregnancy in third trimester Yes       Plan: Return 2 weeks, no problems    This note was electronically signed.    Kobe De Luna MD

## 2017-12-12 ENCOUNTER — ROUTINE PRENATAL (OUTPATIENT)
Dept: OBSTETRICS AND GYNECOLOGY | Facility: CLINIC | Age: 27
End: 2017-12-12

## 2017-12-12 VITALS — WEIGHT: 142 LBS | SYSTOLIC BLOOD PRESSURE: 108 MMHG | BODY MASS INDEX: 25.15 KG/M2 | DIASTOLIC BLOOD PRESSURE: 64 MMHG

## 2017-12-12 DIAGNOSIS — O26.893 VAGINAL DISCHARGE DURING PREGNANCY IN THIRD TRIMESTER: ICD-10-CM

## 2017-12-12 DIAGNOSIS — N89.8 VAGINAL DISCHARGE DURING PREGNANCY IN THIRD TRIMESTER: ICD-10-CM

## 2017-12-12 DIAGNOSIS — Z34.03 ENCOUNTER FOR SUPERVISION OF NORMAL FIRST PREGNANCY IN THIRD TRIMESTER: Primary | ICD-10-CM

## 2017-12-12 PROCEDURE — 90715 TDAP VACCINE 7 YRS/> IM: CPT | Performed by: OBSTETRICS & GYNECOLOGY

## 2017-12-12 PROCEDURE — 0502F SUBSEQUENT PRENATAL CARE: CPT | Performed by: OBSTETRICS & GYNECOLOGY

## 2017-12-12 PROCEDURE — 90471 IMMUNIZATION ADMIN: CPT | Performed by: OBSTETRICS & GYNECOLOGY

## 2017-12-12 NOTE — PROGRESS NOTES
No results found for: ABORH, LABANTI, ABID    Chief Complaint   Patient presents with   • Routine Prenatal Visit     C/o's leaking fluid x 2 days       Today Jeri complains of leaking fluid from the vagina  See ob flowsheet for physical exam.    Prenatal Assessment  Fetal Heart Rate: 132  Movement: Present  Prenatal Vitals  Weight: 64.4 kg (142 lb)     Tests done today: U/S for BPP here  At the time of the next visit, she will need to have none    Impression:   Encounter Diagnoses   Name Primary?   • Encounter for supervision of normal first pregnancy in third trimester Yes   • Vaginal discharge during pregnancy in third trimester        Plan: Return 1 week, Nitrazine was negative, will check fluid with US, LELAND was 24, Tdap was given    This note was electronically signed.    Kobe De Luna MD

## 2017-12-20 ENCOUNTER — ROUTINE PRENATAL (OUTPATIENT)
Dept: OBSTETRICS AND GYNECOLOGY | Facility: CLINIC | Age: 27
End: 2017-12-20

## 2017-12-20 VITALS — SYSTOLIC BLOOD PRESSURE: 110 MMHG | DIASTOLIC BLOOD PRESSURE: 64 MMHG | BODY MASS INDEX: 25.15 KG/M2 | WEIGHT: 142 LBS

## 2017-12-20 DIAGNOSIS — O40.3XX0 POLYHYDRAMNIOS IN THIRD TRIMESTER COMPLICATION, SINGLE OR UNSPECIFIED FETUS: ICD-10-CM

## 2017-12-20 DIAGNOSIS — Z34.03 ENCOUNTER FOR SUPERVISION OF NORMAL FIRST PREGNANCY IN THIRD TRIMESTER: Primary | ICD-10-CM

## 2017-12-20 DIAGNOSIS — O47.03 PRETERM UTERINE CONTRACTIONS, ANTEPARTUM, THIRD TRIMESTER: ICD-10-CM

## 2017-12-20 PROCEDURE — 0502F SUBSEQUENT PRENATAL CARE: CPT | Performed by: OBSTETRICS & GYNECOLOGY

## 2017-12-20 NOTE — PROGRESS NOTES
No results found for: ABORH, LABANTI, ABID    Chief Complaint   Patient presents with   • Routine Prenatal Visit     c/o difficulty sleeping.       Today Jeri complains of irregular contractions   See ob flowsheet for physical exam.    Prenatal Assessment  Fetal Heart Rate: 135  Movement: Present  Prenatal Vitals  BP: 110/64  Weight: 64.4 kg (142 lb)  Urine Glucose/Protein  Urine Glucose: Negative  Urine Protein: Negative     Tests done today: U/S for BPP  At the time of the next visit, she will need to have none    Impression:   Encounter Diagnoses   Name Primary?   • Encounter for supervision of normal first pregnancy in third trimester Yes   • Polyhydramnios in third trimester complication, single or unspecified fetus    •  uterine contractions, antepartum, third trimester        Plan: Return 2 weeks, all BS are normal    This note was electronically signed.    Kobe De Luna MD

## 2018-01-04 ENCOUNTER — ROUTINE PRENATAL (OUTPATIENT)
Dept: OBSTETRICS AND GYNECOLOGY | Facility: CLINIC | Age: 28
End: 2018-01-04

## 2018-01-04 VITALS — DIASTOLIC BLOOD PRESSURE: 60 MMHG | BODY MASS INDEX: 25.69 KG/M2 | WEIGHT: 145 LBS | SYSTOLIC BLOOD PRESSURE: 100 MMHG

## 2018-01-04 DIAGNOSIS — R12 HEARTBURN DURING PREGNANCY IN THIRD TRIMESTER: ICD-10-CM

## 2018-01-04 DIAGNOSIS — Z36.9 ANTENATAL SCREENING ENCOUNTER: ICD-10-CM

## 2018-01-04 DIAGNOSIS — Z34.03 ENCOUNTER FOR SUPERVISION OF NORMAL FIRST PREGNANCY IN THIRD TRIMESTER: Primary | ICD-10-CM

## 2018-01-04 DIAGNOSIS — O40.3XX0 POLYHYDRAMNIOS IN THIRD TRIMESTER COMPLICATION, SINGLE OR UNSPECIFIED FETUS: ICD-10-CM

## 2018-01-04 DIAGNOSIS — O47.03 PRETERM UTERINE CONTRACTIONS, ANTEPARTUM, THIRD TRIMESTER: ICD-10-CM

## 2018-01-04 DIAGNOSIS — O26.893 HEARTBURN DURING PREGNANCY IN THIRD TRIMESTER: ICD-10-CM

## 2018-01-04 PROCEDURE — 0502F SUBSEQUENT PRENATAL CARE: CPT | Performed by: OBSTETRICS & GYNECOLOGY

## 2018-01-04 PROCEDURE — 87081 CULTURE SCREEN ONLY: CPT | Performed by: OBSTETRICS & GYNECOLOGY

## 2018-01-04 NOTE — PROGRESS NOTES
No results found for: ABORH, LABANTI, ABID    Chief Complaint   Patient presents with   • Routine Prenatal Visit     c/o acid reflux       Today Jeri complains of heartburn  See ob flowsheet for physical exam.    Prenatal Assessment  Fetal Heart Rate: 155  Fundal Height (cm): 37 cm  Movement: Present  Presentation: Vertex  Prenatal Vitals  BP: 100/60  Weight: 65.8 kg (145 lb)  Urine Glucose/Protein  Urine Glucose: Negative  Urine Protein: Negative  Vaginal Drainage  Draining Fluid: No  Edema  LLE Edema: None  RLE Edema: None  Facial Edema: None     Tests done today: GBS   At the time of the next visit, she will need to have none    Impression:   Encounter Diagnoses   Name Primary?   • Encounter for supervision of normal first pregnancy in third trimester Yes   • Polyhydramnios in third trimester complication, single or unspecified fetus    •  uterine contractions, antepartum, third trimester    • Heartburn during pregnancy in third trimester    •  screening encounter        Plan: Return 1 week, patient does not want medication for heartburn, Baby is moving well, no problems    This note was electronically signed.    Kobe De Luna MD

## 2018-01-06 LAB
BACTERIA SPEC AEROBE CULT: ABNORMAL
BACTERIA SPEC AEROBE CULT: ABNORMAL
STREP GROUPING: ABNORMAL

## 2018-01-11 ENCOUNTER — ROUTINE PRENATAL (OUTPATIENT)
Dept: OBSTETRICS AND GYNECOLOGY | Facility: CLINIC | Age: 28
End: 2018-01-11

## 2018-01-11 VITALS — SYSTOLIC BLOOD PRESSURE: 100 MMHG | BODY MASS INDEX: 26.39 KG/M2 | DIASTOLIC BLOOD PRESSURE: 56 MMHG | WEIGHT: 149 LBS

## 2018-01-11 DIAGNOSIS — O40.3XX0 POLYHYDRAMNIOS IN THIRD TRIMESTER COMPLICATION, SINGLE OR UNSPECIFIED FETUS: ICD-10-CM

## 2018-01-11 DIAGNOSIS — O47.03 PRETERM UTERINE CONTRACTIONS, ANTEPARTUM, THIRD TRIMESTER: ICD-10-CM

## 2018-01-11 DIAGNOSIS — Z34.03 ENCOUNTER FOR SUPERVISION OF NORMAL FIRST PREGNANCY IN THIRD TRIMESTER: Primary | ICD-10-CM

## 2018-01-11 PROCEDURE — 0502F SUBSEQUENT PRENATAL CARE: CPT | Performed by: OBSTETRICS & GYNECOLOGY

## 2018-01-11 NOTE — PROGRESS NOTES
No results found for: ABORH, LABANTI, ABID    Chief Complaint   Patient presents with   • Routine Prenatal Visit       Today Jeri complains of irregular contractions   See ob flowsheet for physical exam.    Prenatal Assessment  Fetal Heart Rate: 149  Movement: Present  Prenatal Vitals  BP: 100/56  Weight: 67.6 kg (149 lb)  Urine Glucose/Protein  Urine Glucose: Negative  Urine Protein: Negative     Tests done today: none  At the time of the next visit, she will need to have none    Impression:   Encounter Diagnoses   Name Primary?   • Encounter for supervision of normal first pregnancy in third trimester Yes   • Polyhydramnios in third trimester complication, single or unspecified fetus    •  uterine contractions, antepartum, third trimester        Plan: return 1 week    This note was electronically signed.    Kobe De Luna MD

## 2018-01-18 ENCOUNTER — ROUTINE PRENATAL (OUTPATIENT)
Dept: OBSTETRICS AND GYNECOLOGY | Facility: CLINIC | Age: 28
End: 2018-01-18

## 2018-01-18 VITALS — WEIGHT: 150 LBS | SYSTOLIC BLOOD PRESSURE: 98 MMHG | BODY MASS INDEX: 26.57 KG/M2 | DIASTOLIC BLOOD PRESSURE: 54 MMHG

## 2018-01-18 DIAGNOSIS — O40.3XX0 POLYHYDRAMNIOS IN THIRD TRIMESTER COMPLICATION, SINGLE OR UNSPECIFIED FETUS: ICD-10-CM

## 2018-01-18 DIAGNOSIS — Z34.03 ENCOUNTER FOR SUPERVISION OF NORMAL FIRST PREGNANCY IN THIRD TRIMESTER: Primary | ICD-10-CM

## 2018-01-18 PROCEDURE — 0502F SUBSEQUENT PRENATAL CARE: CPT | Performed by: OBSTETRICS & GYNECOLOGY

## 2018-01-18 NOTE — PROGRESS NOTES
No results found for: ABORH, LABANTI, ABID    Chief Complaint   Patient presents with   • Routine Prenatal Visit       Today Jeri has no specific complaints  See ob flowsheet for physical exam.    Prenatal Assessment  Fetal Heart Rate: 142  Movement: Present  Prenatal Vitals  BP: 98/54  Weight: 68 kg (150 lb)  Urine Glucose/Protein  Urine Glucose: Negative  Urine Protein: Negative     Tests done today: none  At the time of the next visit, she will need to have none    Impression:   Encounter Diagnoses   Name Primary?   • Encounter for supervision of normal first pregnancy in third trimester Yes   • Polyhydramnios in third trimester complication, single or unspecified fetus        Plan: return 1 week    This note was electronically signed.    Kobe De Luna MD

## 2018-01-25 ENCOUNTER — ROUTINE PRENATAL (OUTPATIENT)
Dept: OBSTETRICS AND GYNECOLOGY | Facility: CLINIC | Age: 28
End: 2018-01-25

## 2018-01-25 VITALS — BODY MASS INDEX: 26.93 KG/M2 | DIASTOLIC BLOOD PRESSURE: 60 MMHG | WEIGHT: 152 LBS | SYSTOLIC BLOOD PRESSURE: 98 MMHG

## 2018-01-25 DIAGNOSIS — Z34.03 ENCOUNTER FOR SUPERVISION OF NORMAL FIRST PREGNANCY IN THIRD TRIMESTER: Primary | ICD-10-CM

## 2018-01-25 DIAGNOSIS — O40.3XX0 POLYHYDRAMNIOS IN THIRD TRIMESTER COMPLICATION, SINGLE OR UNSPECIFIED FETUS: ICD-10-CM

## 2018-01-25 PROCEDURE — 0502F SUBSEQUENT PRENATAL CARE: CPT | Performed by: OBSTETRICS & GYNECOLOGY

## 2018-01-25 NOTE — PROGRESS NOTES
No results found for: ABORH, LABANTI, ABID    Chief Complaint   Patient presents with   • Routine Prenatal Visit       Today Jeri complains of irregular contractions   See ob flowsheet for physical exam.    Prenatal Assessment  Fetal Heart Rate: 129  Movement: Present  Prenatal Vitals  BP: 98/60  Weight: 68.9 kg (152 lb)     Tests done today: none  At the time of the next visit, she will need to have none    Impression:   Encounter Diagnoses   Name Primary?   • Encounter for supervision of normal first pregnancy in third trimester Yes   • Polyhydramnios in third trimester complication, single or unspecified fetus        Plan: Return 1 week    This note was electronically signed.    Kobe De Luna MD

## 2018-01-26 ENCOUNTER — HOSPITAL ENCOUNTER (INPATIENT)
Facility: HOSPITAL | Age: 28
LOS: 2 days | Discharge: HOME | End: 2018-01-28
Attending: OBSTETRICS & GYNECOLOGY | Admitting: OBSTETRICS & GYNECOLOGY

## 2018-01-26 ENCOUNTER — TELEPHONE (OUTPATIENT)
Dept: OBSTETRICS AND GYNECOLOGY | Facility: CLINIC | Age: 28
End: 2018-01-26

## 2018-01-26 DIAGNOSIS — Z37.9 NORMAL LABOR: ICD-10-CM

## 2018-01-26 PROBLEM — Z34.90 PREGNANCY: Status: RESOLVED | Noted: 2017-09-15 | Resolved: 2018-01-26

## 2018-01-26 PROBLEM — Z33.1 PREGNANT STATE, INCIDENTAL: Status: RESOLVED | Noted: 2018-01-26 | Resolved: 2018-01-26

## 2018-01-26 PROBLEM — Z33.1 PREGNANT STATE, INCIDENTAL: Status: ACTIVE | Noted: 2018-01-26

## 2018-01-26 LAB
ABO GROUP BLD: NORMAL
AMPHET+METHAMPHET UR QL: NEGATIVE
AMPHETAMINES UR QL: NEGATIVE
BARBITURATES UR QL SCN: NEGATIVE
BENZODIAZ UR QL SCN: NEGATIVE
BLD GP AB SCN SERPL QL: NEGATIVE
BUPRENORPHINE SERPL-MCNC: NEGATIVE NG/ML
CANNABINOIDS SERPL QL: NEGATIVE
COCAINE UR QL: NEGATIVE
DEPRECATED RDW RBC AUTO: 42.9 FL (ref 37–54)
ERYTHROCYTE [DISTWIDTH] IN BLOOD BY AUTOMATED COUNT: 12.4 % (ref 11.3–14.5)
EXTERNAL URINE DRUG SCREEN: NEGATIVE
HCT VFR BLD AUTO: 41.8 % (ref 34.5–44)
HGB BLD-MCNC: 14.7 G/DL (ref 11.5–15.5)
MCH RBC QN AUTO: 33.4 PG (ref 27–31)
MCHC RBC AUTO-ENTMCNC: 35.2 G/DL (ref 32–36)
MCV RBC AUTO: 95 FL (ref 80–99)
METHADONE UR QL SCN: NEGATIVE
OPIATES UR QL: NEGATIVE
OXYCODONE UR QL SCN: NEGATIVE
PCP UR QL SCN: NEGATIVE
PLATELET # BLD AUTO: 129 10*3/MM3 (ref 150–450)
PMV BLD AUTO: 14.1 FL (ref 6–12)
PROPOXYPH UR QL: NEGATIVE
RBC # BLD AUTO: 4.4 10*6/MM3 (ref 3.89–5.14)
RH BLD: POSITIVE
TRICYCLICS UR QL SCN: NEGATIVE
WBC NRBC COR # BLD: 12 10*3/MM3 (ref 3.5–10.8)

## 2018-01-26 PROCEDURE — 86901 BLOOD TYPING SEROLOGIC RH(D): CPT | Performed by: OBSTETRICS & GYNECOLOGY

## 2018-01-26 PROCEDURE — 25010000002 MORPHINE (PF) 10 MG/ML SOLUTION: Performed by: OBSTETRICS & GYNECOLOGY

## 2018-01-26 PROCEDURE — 25010000002 ONDANSETRON PER 1 MG: Performed by: OBSTETRICS & GYNECOLOGY

## 2018-01-26 PROCEDURE — 86850 RBC ANTIBODY SCREEN: CPT | Performed by: OBSTETRICS & GYNECOLOGY

## 2018-01-26 PROCEDURE — 59400 OBSTETRICAL CARE: CPT | Performed by: OBSTETRICS & GYNECOLOGY

## 2018-01-26 PROCEDURE — 0DQR0ZZ REPAIR ANAL SPHINCTER, OPEN APPROACH: ICD-10-PCS | Performed by: OBSTETRICS & GYNECOLOGY

## 2018-01-26 PROCEDURE — 80306 DRUG TEST PRSMV INSTRMNT: CPT | Performed by: OBSTETRICS & GYNECOLOGY

## 2018-01-26 PROCEDURE — 86900 BLOOD TYPING SEROLOGIC ABO: CPT | Performed by: OBSTETRICS & GYNECOLOGY

## 2018-01-26 PROCEDURE — 85027 COMPLETE CBC AUTOMATED: CPT | Performed by: OBSTETRICS & GYNECOLOGY

## 2018-01-26 PROCEDURE — 59025 FETAL NON-STRESS TEST: CPT

## 2018-01-26 RX ORDER — CARBOPROST TROMETHAMINE 250 UG/ML
250 INJECTION, SOLUTION INTRAMUSCULAR AS NEEDED
Status: DISCONTINUED | OUTPATIENT
Start: 2018-01-26 | End: 2018-01-26 | Stop reason: HOSPADM

## 2018-01-26 RX ORDER — FAMOTIDINE 10 MG/ML
20 INJECTION, SOLUTION INTRAVENOUS 2 TIMES DAILY PRN
Status: DISCONTINUED | OUTPATIENT
Start: 2018-01-26 | End: 2018-01-26

## 2018-01-26 RX ORDER — MORPHINE SULFATE 2 MG/ML
8 INJECTION, SOLUTION INTRAMUSCULAR; INTRAVENOUS ONCE
Status: DISCONTINUED | OUTPATIENT
Start: 2018-01-26 | End: 2018-01-26 | Stop reason: SDUPTHER

## 2018-01-26 RX ORDER — METHYLERGONOVINE MALEATE 0.2 MG/ML
200 INJECTION INTRAVENOUS ONCE AS NEEDED
Status: DISCONTINUED | OUTPATIENT
Start: 2018-01-26 | End: 2018-01-26 | Stop reason: HOSPADM

## 2018-01-26 RX ORDER — MAGNESIUM CARB/ALUMINUM HYDROX 105-160MG
30 TABLET,CHEWABLE ORAL ONCE
Status: COMPLETED | OUTPATIENT
Start: 2018-01-26 | End: 2018-01-26

## 2018-01-26 RX ORDER — CLINDAMYCIN PHOSPHATE 900 MG/50ML
900 INJECTION INTRAVENOUS EVERY 8 HOURS
Status: DISCONTINUED | OUTPATIENT
Start: 2018-01-27 | End: 2018-01-27

## 2018-01-26 RX ORDER — OXYCODONE HYDROCHLORIDE AND ACETAMINOPHEN 5; 325 MG/1; MG/1
1 TABLET ORAL EVERY 4 HOURS PRN
Status: DISCONTINUED | OUTPATIENT
Start: 2018-01-26 | End: 2018-01-27

## 2018-01-26 RX ORDER — ONDANSETRON 4 MG/1
4 TABLET, FILM COATED ORAL EVERY 6 HOURS PRN
Status: DISCONTINUED | OUTPATIENT
Start: 2018-01-26 | End: 2018-01-26 | Stop reason: HOSPADM

## 2018-01-26 RX ORDER — MORPHINE SULFATE 10 MG/ML
8 INJECTION, SOLUTION INTRAMUSCULAR; INTRAVENOUS ONCE
Status: COMPLETED | OUTPATIENT
Start: 2018-01-26 | End: 2018-01-26

## 2018-01-26 RX ORDER — CLINDAMYCIN PHOSPHATE 900 MG/50ML
900 INJECTION INTRAVENOUS EVERY 8 HOURS
Status: DISCONTINUED | OUTPATIENT
Start: 2018-01-26 | End: 2018-01-26

## 2018-01-26 RX ORDER — ONDANSETRON 2 MG/ML
4 INJECTION INTRAMUSCULAR; INTRAVENOUS EVERY 6 HOURS PRN
Status: DISCONTINUED | OUTPATIENT
Start: 2018-01-26 | End: 2018-01-26 | Stop reason: HOSPADM

## 2018-01-26 RX ORDER — HYDROCODONE BITARTRATE AND ACETAMINOPHEN 5; 325 MG/1; MG/1
1 TABLET ORAL EVERY 4 HOURS PRN
Status: DISCONTINUED | OUTPATIENT
Start: 2018-01-26 | End: 2018-01-27

## 2018-01-26 RX ORDER — BISACODYL 10 MG
10 SUPPOSITORY, RECTAL RECTAL DAILY PRN
Status: DISCONTINUED | OUTPATIENT
Start: 2018-01-27 | End: 2018-01-28 | Stop reason: HOSPADM

## 2018-01-26 RX ORDER — LANOLIN 100 %
OINTMENT (GRAM) TOPICAL AS NEEDED
Status: DISCONTINUED | OUTPATIENT
Start: 2018-01-26 | End: 2018-01-28 | Stop reason: HOSPADM

## 2018-01-26 RX ORDER — PRENATAL VIT/IRON FUM/FOLIC AC 27MG-0.8MG
1 TABLET ORAL DAILY
Status: DISCONTINUED | OUTPATIENT
Start: 2018-01-27 | End: 2018-01-28 | Stop reason: HOSPADM

## 2018-01-26 RX ORDER — SODIUM CHLORIDE, SODIUM LACTATE, POTASSIUM CHLORIDE, CALCIUM CHLORIDE 600; 310; 30; 20 MG/100ML; MG/100ML; MG/100ML; MG/100ML
125 INJECTION, SOLUTION INTRAVENOUS CONTINUOUS
Status: DISCONTINUED | OUTPATIENT
Start: 2018-01-26 | End: 2018-01-26

## 2018-01-26 RX ORDER — LIDOCAINE HYDROCHLORIDE 10 MG/ML
5 INJECTION, SOLUTION INFILTRATION; PERINEURAL AS NEEDED
Status: DISCONTINUED | OUTPATIENT
Start: 2018-01-26 | End: 2018-01-26 | Stop reason: HOSPADM

## 2018-01-26 RX ORDER — CLINDAMYCIN PHOSPHATE 900 MG/50ML
900 INJECTION INTRAVENOUS EVERY 8 HOURS
Status: DISCONTINUED | OUTPATIENT
Start: 2018-01-26 | End: 2018-01-26 | Stop reason: SDUPTHER

## 2018-01-26 RX ORDER — ZOLPIDEM TARTRATE 5 MG/1
5 TABLET ORAL NIGHTLY PRN
Status: DISCONTINUED | OUTPATIENT
Start: 2018-01-26 | End: 2018-01-28 | Stop reason: HOSPADM

## 2018-01-26 RX ORDER — OXYTOCIN/RINGER'S LACTATE 20/1000 ML
999 PLASTIC BAG, INJECTION (ML) INTRAVENOUS ONCE
Status: COMPLETED | OUTPATIENT
Start: 2018-01-26 | End: 2018-01-26

## 2018-01-26 RX ORDER — IBUPROFEN 600 MG/1
600 TABLET ORAL EVERY 6 HOURS PRN
Status: DISCONTINUED | OUTPATIENT
Start: 2018-01-26 | End: 2018-01-28 | Stop reason: HOSPADM

## 2018-01-26 RX ORDER — DOCUSATE SODIUM 100 MG/1
100 CAPSULE, LIQUID FILLED ORAL 2 TIMES DAILY
Status: DISCONTINUED | OUTPATIENT
Start: 2018-01-26 | End: 2018-01-28 | Stop reason: HOSPADM

## 2018-01-26 RX ORDER — OXYCODONE HYDROCHLORIDE AND ACETAMINOPHEN 5; 325 MG/1; MG/1
2 TABLET ORAL EVERY 4 HOURS PRN
Status: DISCONTINUED | OUTPATIENT
Start: 2018-01-26 | End: 2018-01-26 | Stop reason: HOSPADM

## 2018-01-26 RX ORDER — GENTAMICIN SULFATE 60 MG/50ML
2 INJECTION, SOLUTION INTRAVENOUS ONCE
Status: DISCONTINUED | OUTPATIENT
Start: 2018-01-26 | End: 2018-01-26

## 2018-01-26 RX ORDER — PROMETHAZINE HYDROCHLORIDE 25 MG/1
25 TABLET ORAL EVERY 6 HOURS PRN
Status: DISCONTINUED | OUTPATIENT
Start: 2018-01-26 | End: 2018-01-28 | Stop reason: HOSPADM

## 2018-01-26 RX ORDER — MISOPROSTOL 200 UG/1
800 TABLET ORAL AS NEEDED
Status: DISCONTINUED | OUTPATIENT
Start: 2018-01-26 | End: 2018-01-26 | Stop reason: HOSPADM

## 2018-01-26 RX ORDER — SODIUM CHLORIDE 0.9 % (FLUSH) 0.9 %
1-10 SYRINGE (ML) INJECTION AS NEEDED
Status: DISCONTINUED | OUTPATIENT
Start: 2018-01-26 | End: 2018-01-26 | Stop reason: HOSPADM

## 2018-01-26 RX ORDER — OXYTOCIN/RINGER'S LACTATE 20/1000 ML
125 PLASTIC BAG, INJECTION (ML) INTRAVENOUS CONTINUOUS PRN
Status: DISCONTINUED | OUTPATIENT
Start: 2018-01-26 | End: 2018-01-26 | Stop reason: HOSPADM

## 2018-01-26 RX ADMIN — CLINDAMYCIN PHOSPHATE 900 MG: 18 INJECTION, SOLUTION INTRAVENOUS at 18:41

## 2018-01-26 RX ADMIN — FAMOTIDINE 20 MG: 10 INJECTION, SOLUTION INTRAVENOUS at 10:58

## 2018-01-26 RX ADMIN — LIDOCAINE HYDROCHLORIDE 5 ML: 10 INJECTION, SOLUTION INFILTRATION; PERINEURAL at 17:35

## 2018-01-26 RX ADMIN — IBUPROFEN 600 MG: 600 TABLET, FILM COATED ORAL at 21:11

## 2018-01-26 RX ADMIN — DOCUSATE SODIUM 100 MG: 100 CAPSULE, LIQUID FILLED ORAL at 21:11

## 2018-01-26 RX ADMIN — MINERAL OIL 30 ML: 99.9 LIQUID ORAL; TOPICAL at 14:56

## 2018-01-26 RX ADMIN — ONDANSETRON 4 MG: 2 INJECTION INTRAMUSCULAR; INTRAVENOUS at 14:10

## 2018-01-26 RX ADMIN — OXYTOCIN 999 ML/HR: 10 INJECTION INTRAVENOUS at 17:37

## 2018-01-26 RX ADMIN — SODIUM CHLORIDE, POTASSIUM CHLORIDE, SODIUM LACTATE AND CALCIUM CHLORIDE 125 ML/HR: 600; 310; 30; 20 INJECTION, SOLUTION INTRAVENOUS at 10:00

## 2018-01-26 RX ADMIN — MORPHINE SULFATE 8 MG: 10 INJECTION, SOLUTION INTRAMUSCULAR; INTRAVENOUS at 17:50

## 2018-01-26 RX ADMIN — CLINDAMYCIN PHOSPHATE 900 MG: 18 INJECTION, SOLUTION INTRAVENOUS at 10:59

## 2018-01-26 NOTE — TELEPHONE ENCOUNTER
Pt called c/o contractions 3-4 minutes apart x 3-4 hours without discomfort. Denies leaking fluid or bleeding and reports good fetal movement. Advised to rest at home until uncomfortable contractions become regular or membranes rupture. Pt verbalizes understanding.

## 2018-01-27 LAB
BASOPHILS # BLD AUTO: 0.01 10*3/MM3 (ref 0–0.2)
BASOPHILS NFR BLD AUTO: 0.1 % (ref 0–1)
DEPRECATED RDW RBC AUTO: 44.3 FL (ref 37–54)
EOSINOPHIL # BLD AUTO: 0.01 10*3/MM3 (ref 0–0.3)
EOSINOPHIL NFR BLD AUTO: 0.1 % (ref 0–3)
ERYTHROCYTE [DISTWIDTH] IN BLOOD BY AUTOMATED COUNT: 12.8 % (ref 11.3–14.5)
HCT VFR BLD AUTO: 34.8 % (ref 34.5–44)
HGB BLD-MCNC: 12 G/DL (ref 11.5–15.5)
IMM GRANULOCYTES # BLD: 0.07 10*3/MM3 (ref 0–0.03)
IMM GRANULOCYTES NFR BLD: 0.4 % (ref 0–0.6)
LYMPHOCYTES # BLD AUTO: 1.8 10*3/MM3 (ref 0.6–4.8)
LYMPHOCYTES NFR BLD AUTO: 11 % (ref 24–44)
MCH RBC QN AUTO: 32.9 PG (ref 27–31)
MCHC RBC AUTO-ENTMCNC: 34.5 G/DL (ref 32–36)
MCV RBC AUTO: 95.3 FL (ref 80–99)
MONOCYTES # BLD AUTO: 1.47 10*3/MM3 (ref 0–1)
MONOCYTES NFR BLD AUTO: 9 % (ref 0–12)
NEUTROPHILS # BLD AUTO: 13.05 10*3/MM3 (ref 1.5–8.3)
NEUTROPHILS NFR BLD AUTO: 79.4 % (ref 41–71)
PLATELET # BLD AUTO: 121 10*3/MM3 (ref 150–450)
PMV BLD AUTO: 14.2 FL (ref 6–12)
RBC # BLD AUTO: 3.65 10*6/MM3 (ref 3.89–5.14)
WBC NRBC COR # BLD: 16.41 10*3/MM3 (ref 3.5–10.8)

## 2018-01-27 PROCEDURE — 85025 COMPLETE CBC W/AUTO DIFF WBC: CPT | Performed by: OBSTETRICS & GYNECOLOGY

## 2018-01-27 PROCEDURE — 25010000002 GENTAMICIN PER 80 MG: Performed by: OBSTETRICS & GYNECOLOGY

## 2018-01-27 RX ORDER — CLINDAMYCIN PHOSPHATE 900 MG/50ML
900 INJECTION INTRAVENOUS ONCE
Status: COMPLETED | OUTPATIENT
Start: 2018-01-27 | End: 2018-01-27

## 2018-01-27 RX ADMIN — PRENATAL VIT W/ FE FUMARATE-FA TAB 27-0.8 MG 1 TABLET: 27-0.8 TAB at 08:36

## 2018-01-27 RX ADMIN — IBUPROFEN 600 MG: 600 TABLET, FILM COATED ORAL at 04:15

## 2018-01-27 RX ADMIN — WITCH HAZEL 1 PAD: 500 SOLUTION RECTAL; TOPICAL at 01:16

## 2018-01-27 RX ADMIN — CLINDAMYCIN PHOSPHATE 900 MG: 18 INJECTION, SOLUTION INTRAVENOUS at 10:56

## 2018-01-27 RX ADMIN — IBUPROFEN 600 MG: 600 TABLET, FILM COATED ORAL at 17:54

## 2018-01-27 RX ADMIN — BENZOCAINE AND MENTHOL: 20; .5 SPRAY TOPICAL at 01:16

## 2018-01-27 RX ADMIN — GENTAMICIN SULFATE 90 MG: 40 INJECTION, SOLUTION INTRAMUSCULAR; INTRAVENOUS at 01:16

## 2018-01-27 RX ADMIN — DOCUSATE SODIUM 100 MG: 100 CAPSULE, LIQUID FILLED ORAL at 22:20

## 2018-01-27 RX ADMIN — IBUPROFEN 600 MG: 600 TABLET, FILM COATED ORAL at 11:04

## 2018-01-27 RX ADMIN — DOCUSATE SODIUM 100 MG: 100 CAPSULE, LIQUID FILLED ORAL at 08:36

## 2018-01-27 RX ADMIN — GENTAMICIN SULFATE 90 MG: 40 INJECTION, SOLUTION INTRAMUSCULAR; INTRAVENOUS at 10:01

## 2018-01-27 RX ADMIN — HYDROCORTISONE 2.5% 1 APPLICATION: 25 CREAM TOPICAL at 01:16

## 2018-01-27 RX ADMIN — CLINDAMYCIN PHOSPHATE 900 MG: 18 INJECTION, SOLUTION INTRAVENOUS at 02:17

## 2018-01-28 VITALS
HEART RATE: 78 BPM | HEIGHT: 64 IN | RESPIRATION RATE: 16 BRPM | SYSTOLIC BLOOD PRESSURE: 113 MMHG | WEIGHT: 149.7 LBS | TEMPERATURE: 98.3 F | DIASTOLIC BLOOD PRESSURE: 73 MMHG | BODY MASS INDEX: 25.56 KG/M2

## 2018-01-28 RX ORDER — IBUPROFEN 600 MG/1
600 TABLET ORAL EVERY 6 HOURS PRN
Start: 2018-01-28 | End: 2023-03-14

## 2018-01-28 RX ADMIN — IBUPROFEN 600 MG: 600 TABLET, FILM COATED ORAL at 12:30

## 2018-01-28 RX ADMIN — PRENATAL VIT W/ FE FUMARATE-FA TAB 27-0.8 MG 1 TABLET: 27-0.8 TAB at 12:30

## 2018-01-28 RX ADMIN — IBUPROFEN 600 MG: 600 TABLET, FILM COATED ORAL at 06:10

## 2018-01-28 RX ADMIN — DOCUSATE SODIUM 100 MG: 100 CAPSULE, LIQUID FILLED ORAL at 12:30

## 2018-01-28 RX ADMIN — IBUPROFEN 600 MG: 600 TABLET, FILM COATED ORAL at 00:20

## 2018-03-09 ENCOUNTER — POSTPARTUM VISIT (OUTPATIENT)
Dept: OBSTETRICS AND GYNECOLOGY | Facility: CLINIC | Age: 28
End: 2018-03-09

## 2018-03-09 VITALS
WEIGHT: 125.4 LBS | DIASTOLIC BLOOD PRESSURE: 68 MMHG | SYSTOLIC BLOOD PRESSURE: 108 MMHG | HEIGHT: 64 IN | BODY MASS INDEX: 21.41 KG/M2

## 2018-03-09 DIAGNOSIS — Z30.09 BIRTH CONTROL COUNSELING: ICD-10-CM

## 2018-03-09 PROCEDURE — 0503F POSTPARTUM CARE VISIT: CPT | Performed by: OBSTETRICS & GYNECOLOGY

## 2018-03-09 NOTE — PROGRESS NOTES
Subjective   Jeri Chambers is a 27 y.o. female.     History of Present Illness  Patient is 6 weeks post vaginal delivery with a third-degree extension of episiotomy.  She has had 2 episodes of stool incontinence associated with very soft stools but otherwise is doing very well.  She wants to use condoms for birth control and will continue breast-feeding which is going well.  She has no postpartum depression.  Her Pap smear was in November 2016 and she will repeat an annual in the fall of this year in the spring of next period      The following portions of the patient's history were reviewed and updated as appropriate: allergies, current medications, past family history, past medical history, past social history, past surgical history and problem list.    Review of Systems   Constitutional: Negative.    Gastrointestinal: Negative.    Genitourinary: Negative.    Psychiatric/Behavioral: Negative.        Objective   Physical Exam   Constitutional: She appears well-nourished.   Genitourinary: Vagina normal and uterus normal. Rectal exam shows no external hemorrhoid, no internal hemorrhoid and anal tone normal. Pelvic exam was performed with patient supine. No labial fusion. There is no rash, tenderness, lesion or injury on the right labia. There is no rash, tenderness, lesion or injury on the left labia. Cervix exhibits no motion tenderness, no discharge and no friability. Right adnexum displays no mass, no tenderness and no fullness. Left adnexum displays no mass, no tenderness and no fullness.   Nursing note and vitals reviewed.      Assessment/Plan   Jeri was seen today for postpartum care.    Diagnoses and all orders for this visit:    Postpartum care and examination of lactating mother    Birth control counseling       Condoms for birth control  Return one year    Kobe De Luna MD

## 2019-02-15 ENCOUNTER — OFFICE VISIT (OUTPATIENT)
Dept: OBSTETRICS AND GYNECOLOGY | Facility: CLINIC | Age: 29
End: 2019-02-15

## 2019-02-15 VITALS
SYSTOLIC BLOOD PRESSURE: 100 MMHG | HEIGHT: 64 IN | WEIGHT: 126 LBS | DIASTOLIC BLOOD PRESSURE: 60 MMHG | BODY MASS INDEX: 21.51 KG/M2

## 2019-02-15 DIAGNOSIS — Z30.09 BIRTH CONTROL COUNSELING: ICD-10-CM

## 2019-02-15 DIAGNOSIS — Z01.419 WELL WOMAN EXAM WITH ROUTINE GYNECOLOGICAL EXAM: Primary | ICD-10-CM

## 2019-02-15 PROCEDURE — 99395 PREV VISIT EST AGE 18-39: CPT | Performed by: OBSTETRICS & GYNECOLOGY

## 2019-02-15 NOTE — PROGRESS NOTES
Subjective   Chief Complaint   Patient presents with   • Routine Prenatal Visit     pt here for annual. last pap 16 negative.no problems   • Contraception     May want to start BCP     Jeri Chambers is a 28 y.o. year old  presenting to be seen for her annual exam.  Patient is still continuing to breast-feed.  She wants to start the birth control pills and she was encouraged to use do so when she wanted to stop breast-feeding.  A prescription for birth control pills was sent to her pharmacy.  She is having no other GYN problems.    SEXUAL Hx:  She is currently sexually active.  In the past year there has not been new sexual partners.    Condoms ARE typically used.  She would not like to be screened for STD's at today's exam.  Current birth control method: condoms.  She is not happy with her current method of contraception and does want to discuss alternative methods of contraception.  MENSTRUAL Hx:  No LMP recorded.  In the past 6 months her cycles have been absent.  Her menstrual flow has been absent.   Each month on average there are roughly 0 day(s) of very heavy flow.    Intermenstrual bleeding is absent.    Post-coital bleeding is absent.  Dysmenorrhea: none  PMS: is not affecting her activities of daily living  Her cycles are not a source of concern for her that she wishes to discuss today.  HEALTH Hx:  She exercises regularly:no (and has no plans to become more active).  She wears her seat belt:yes.  She has concerns about domestic violence: no.  OTHER COMPLAINTS:  Nothing else    The following portions of the patient's history were reviewed and updated as appropriate:problem list, current medications, allergies, past family history, past medical history, past social history and past surgical history.    Social History    Tobacco Use      Smoking status: Never Smoker      Smokeless tobacco: Never Used    Review of Systems  Review of Systems - History obtained from the patient  General ROS:  "negative  Psychological ROS: negative  ENT ROS: negative  Allergy and Immunology ROS: negative  Hematological and Lymphatic ROS: negative  Endocrine ROS: negative  Breast ROS: small lump in right breast  Respiratory ROS: no cough, shortness of breath, or wheezing  Cardiovascular ROS: no chest pain or dyspnea on exertion  Gastrointestinal ROS: no abdominal pain, change in bowel habits, or black or bloody stools  Genito-Urinary ROS: no dysuria, trouble voiding, or hematuria  Musculoskeletal ROS: negative  Neurological ROS: no TIA or stroke symptoms  Dermatological ROS: negative        Objective   /60   Ht 161.3 cm (63.5\")   Wt 57.2 kg (126 lb)   Breastfeeding? Yes Comment: BC Condoms  BMI 21.97 kg/m²      General:  well developed; well nourished  no acute distress   Skin:  No suspicious lesions seen   Thyroid: normal to inspection and palpation   Breasts:  Examined in supine position  Symmetric without masses or skin dimpling  Nipples normal without inversion, lesions or discharge  There are no palpable axillary nodes   Abdomen: soft, non-tender; no masses  no umbilical or inguinal hernias are present  no hepato-splenomegaly   Heart: regular rate and rhythm, S1, S2 normal, no murmur, click, rub or gallop   Lungs: clear to auscultation   Pelvis: Clinical staff was present for exam  External genitalia:  normal appearance of the external genitalia including Bartholin's and [place]'s glands.  :  urethral meatus normal;  Vaginal:  normal pink mucosa without prolapse or lesions. blood present -  small amount;  Cervix:  normal appearance. Pap smear was done  Uterus:  normal size, shape and consistency. anteverted;  Adnexa:  normal bimanual exam of the adnexa.  Rectal:  digital rectal exam not performed; anus visually normal appearing.          Assessment/Plan   Jeri was seen today for routine prenatal visit and contraception.    Diagnoses and all orders for this visit:    Well woman exam with routine " gynecological exam  -     Liquid-based Pap Smear, Screening    Birth control counseling  -     Norethin-Eth Estrad-Fe Biphas (LO LOESTRIN FE) 1 MG-10 MCG / 10 MCG tablet; Take 1 tablet by mouth Daily.         Return in 1 year    This note was electronically signed.    Kobe De Luna MD   February 15, 2019

## 2020-02-21 ENCOUNTER — OFFICE VISIT (OUTPATIENT)
Dept: OBSTETRICS AND GYNECOLOGY | Facility: CLINIC | Age: 30
End: 2020-02-21

## 2020-02-21 VITALS
BODY MASS INDEX: 18.78 KG/M2 | WEIGHT: 110 LBS | DIASTOLIC BLOOD PRESSURE: 76 MMHG | RESPIRATION RATE: 14 BRPM | SYSTOLIC BLOOD PRESSURE: 120 MMHG | HEIGHT: 64 IN

## 2020-02-21 DIAGNOSIS — N63.11 BREAST LUMP ON RIGHT SIDE AT 10 O'CLOCK POSITION: ICD-10-CM

## 2020-02-21 DIAGNOSIS — Z01.419 WELL WOMAN EXAM WITH ROUTINE GYNECOLOGICAL EXAM: Primary | ICD-10-CM

## 2020-02-21 PROCEDURE — 99395 PREV VISIT EST AGE 18-39: CPT | Performed by: OBSTETRICS & GYNECOLOGY

## 2020-02-21 NOTE — PROGRESS NOTES
Subjective   Chief Complaint   Patient presents with   • Gynecologic Exam     No complaints     Jeri Chambers is a 29 y.o. year old  presenting to be seen for her annual exam.  Patient is off birth control pills and is considering becoming pregnant this summer.  She is on prenatal vitamins.  Patient had a right breast lump which was biopsied and was benign.  The lump has not changed since the needle biopsy.  She is having no other GYN problems.    SEXUAL Hx:  She is currently sexually active.  In the past year there has not been new sexual partners.    Condoms ARE typically used.  She would not like to be screened for STD's at today's exam.  Current birth control method: condoms.  She is happy with her current method of contraception and does not want to discuss alternative methods of contraception.  MENSTRUAL Hx:  Patient's last menstrual period was 2020 (exact date).  In the past 6 months her cycles have been regular, predictable and occur monthly.  Her menstrual flow is typically moderately heavy.   Each month on average there are roughly 4 day(s) of very heavy flow.    Intermenstrual bleeding is absent.    Post-coital bleeding is absent.  Dysmenorrhea: is not affecting her activities of daily living  PMS: is not affecting her activities of daily living  Her cycles are not a source of concern for her that she wishes to discuss today.  HEALTH Hx:  She exercises regularly:yes.  She wears her seat belt:yes.  She has concerns about domestic violence: no.  OTHER COMPLAINTS:  Nothing else    The following portions of the patient's history were reviewed and updated as appropriate:problem list, current medications, allergies, past family history, past medical history, past social history and past surgical history.    Social History    Tobacco Use      Smoking status: Never Smoker      Smokeless tobacco: Never Used    Review of Systems  Review of Systems - History obtained from the patient  General ROS:  "negative  Psychological ROS: negative  ENT ROS: negative  Allergy and Immunology ROS: negative  Hematological and Lymphatic ROS: negative  Endocrine ROS: negative  Breast ROS: negative for breast lumps  Respiratory ROS: no cough, shortness of breath, or wheezing  Cardiovascular ROS: no chest pain or dyspnea on exertion  Gastrointestinal ROS: no abdominal pain, change in bowel habits, or black or bloody stools  Genito-Urinary ROS: no dysuria, trouble voiding, or hematuria  Musculoskeletal ROS: negative  Neurological ROS: no TIA or stroke symptoms  Dermatological ROS: negative        Objective   /76   Resp 14   Ht 161.3 cm (63.5\")   Wt 49.9 kg (110 lb)   LMP 02/01/2020 (Exact Date)   Breastfeeding No   BMI 19.18 kg/m²     General:  well developed; well nourished  no acute distress   Skin:  No suspicious lesions seen   Thyroid: normal to inspection and palpation   Breasts:  Examined in supine position  Symmetric without masses or skin dimpling  Nipples normal without inversion, lesions or discharge  There are no palpable axillary nodes  There is an ~  less than 1 cm mass is present in the right breast at 10 o'clock.  It is 7 cm from the outer edge of the areola.  It is freely mobile.   Abdomen: soft, non-tender; no masses  no umbilical or inguinal hernias are present  no hepato-splenomegaly   Heart: regular rate and rhythm, S1, S2 normal, no murmur, click, rub or gallop   Lungs: clear to auscultation   Pelvis: Clinical staff was present for exam  External genitalia:  normal appearance of the external genitalia including Bartholin's and [place]'s glands.  :  urethral meatus normal;  Vaginal:  normal pink mucosa without prolapse or lesions.  Cervix:  normal appearance. Pap smear was done  Uterus:  normal size, shape and consistency. anteverted;  Adnexa:  normal bimanual exam of the adnexa.  Rectal:  digital rectal exam not performed; anus visually normal appearing.          Assessment/Plan   Jeri was seen " today for gynecologic exam.    Diagnoses and all orders for this visit:    Well woman exam with routine gynecological exam  -     Liquid-based Pap Smear, Screening    Breast lump on right side at 10 o'clock position         Return as needed  This note was electronically signed.    Kobe De Luna MD   February 21, 2020

## 2020-07-13 ENCOUNTER — TELEPHONE (OUTPATIENT)
Dept: OBSTETRICS AND GYNECOLOGY | Facility: CLINIC | Age: 30
End: 2020-07-13

## 2020-07-13 NOTE — TELEPHONE ENCOUNTER
Dr De Luna patient called in to request a refill of her birth control, Lo Lo Estrin FE. Patient said that she told Dr De Luna not to fill it at her last Annual in January 2020 because her and her  were planning to have another baby but with the pandemic they have decided not to move forward. She uses [facility] in [facility].

## 2020-12-14 ENCOUNTER — OFFICE VISIT (OUTPATIENT)
Dept: OBSTETRICS AND GYNECOLOGY | Facility: CLINIC | Age: 30
End: 2020-12-14

## 2020-12-14 VITALS
DIASTOLIC BLOOD PRESSURE: 62 MMHG | RESPIRATION RATE: 14 BRPM | BODY MASS INDEX: 19.91 KG/M2 | WEIGHT: 116.6 LBS | SYSTOLIC BLOOD PRESSURE: 98 MMHG | HEIGHT: 64 IN

## 2020-12-14 DIAGNOSIS — N94.89 LABIAL PAIN: Primary | ICD-10-CM

## 2020-12-14 PROCEDURE — 99212 OFFICE O/P EST SF 10 MIN: CPT | Performed by: OBSTETRICS & GYNECOLOGY

## 2020-12-14 NOTE — PROGRESS NOTES
Subjective   Jeri Chambers is a 30 y.o. female is here today as a self referral.    Chief Complaint   Patient presents with   • Follow-up     Patient states on Wednesday she noticed left labial pain. She has tried applying heat and it is not getting better.        History of Present Illness  Patient developed pain on the left labia since 12/9/2020.  Pain is not improving.  Patient cannot see a rash or any malformation.  The following portions of the patient's history were reviewed and updated as appropriate: allergies, current medications, past family history, past medical history, past social history, past surgical history and problem list.    Review of Systems - Negative except for present illness    Objective   General:  well developed; well nourished  no acute distress   Skin:  Not performed.   Thyroid: not examined   Breasts:  Not performed.   Abdomen: Not performed.   Heart: regular rate and rhythm, S1, S2 normal, no murmur, click, rub or gallop   Lungs: clear to auscultation   Pelvis: Clinical staff was present for exam  External genitalia:  normal appearance of the external genitalia including Bartholin's and [place]'s glands. Patient is describing the pain in the area of the left Bartholin gland, no abnormalities noted  :  urethral meatus normal;  Vaginal:  normal pink mucosa without prolapse or lesions.  Cervix:  normal appearance.  Uterus:  normal size, shape and consistency.  Adnexa:  normal bimanual exam of the adnexa.  Rectal:  digital rectal exam not performed; anus visually normal appearing.         Assessment/Plan   Diagnoses and all orders for this visit:    1. Labial pain (Primary)      Bartholin gland cyst were discussed  Patient will use Motrin to treat the pain  Return as needed    Kobe De Luna MD

## 2021-06-29 ENCOUNTER — TELEPHONE (OUTPATIENT)
Dept: OBSTETRICS AND GYNECOLOGY | Facility: CLINIC | Age: 31
End: 2021-06-29

## 2021-06-29 DIAGNOSIS — Z72.51 UNPROTECTED SEXUAL INTERCOURSE: Primary | ICD-10-CM

## 2021-06-29 NOTE — TELEPHONE ENCOUNTER
[facility] pt called stating she is 3 days late for cycle. Negative pregnancy test last night, this morning had a faint positive. Requesting to have blood work done. Please advise

## 2021-06-29 NOTE — TELEPHONE ENCOUNTER
Dr. De Luna's patient called requesting a blood pregnancy test.    [phone] returned patient's call. Patient states this morning when she woke up she felt nauseated so she decided to take a urine pregnancy test and it was faint positive.   LMP: 5/31/2021(exact)  I advised patient I will place an order for a blood pregnancy test and someone will give her a call once the results come back. Patient verbalized understanding.

## 2021-06-30 ENCOUNTER — LAB (OUTPATIENT)
Dept: LAB | Facility: HOSPITAL | Age: 31
End: 2021-06-30

## 2021-06-30 DIAGNOSIS — Z72.51 UNPROTECTED SEXUAL INTERCOURSE: ICD-10-CM

## 2021-06-30 LAB — HCG INTACT+B SERPL-ACNC: <0.5 MIU/ML

## 2021-06-30 PROCEDURE — 84702 CHORIONIC GONADOTROPIN TEST: CPT
